# Patient Record
(demographics unavailable — no encounter records)

---

## 2017-04-13 NOTE — OP
DATE OF OPERATION:  04/12/17 [facility]

 

DATE OF BIRTH:  05/24/92

 

SURGEON:  Ron Sher MD

 

ASSISTANT:  MICHELLE Gramajo

 

ANESTHESIOLOGIST:  Dr. De La Paz.

 

ANESTHESIA:  General.

 

PRE-OP DIAGNOSIS:  Chronic cholecystitis.

 

POST-OP DIAGNOSIS:  Chronic cholecystitis.

 

OPERATIVE PROCEDURE:  Laparoscopic cholecystectomy.

 

ESTIMATED BLOOD LOSS:  Minimal blood loss.

 

FLUIDS:  Minimal crystalloid fluid given.

 

SPECIMEN:  Gallbladder.

 

DRAINS:  None.

 

DESCRIPTION OF PROCEDURE:  The patient was identified in the preoperative area, 
brought to the OR, placed on the operating table in the supine position. 
Preoperative antibiotics were given.  Sequential devices were placed on 
bilateral lower extremities.  General anesthesia was induced. The patient's 
abdomen was prepped and draped in standard surgical fashion and time-out was 
performed.

 

Folds of the umbilicus were elevated anteriorly and a Veress needle was 
inserted into the abdominal cavity, which was then allowed to insufflate to a 
pressure of 15 mmHg.  The patient tolerated the insufflation well.  Incision 
was made over the Veress needle, which was removed and a 5-mm trocar inserted 
through the umbilicus. Laparoscope was inserted through this.  There was no 
evidence of injury in the trocar insertion of the Veress needle.

 

Attention was turned towards the right upper quadrant. Additional trocars were 
then placed in the following position: 8 mm in the subxiphoid area, two 5 mm 
along the right costal margin.

 

Fundus of the gallbladder was identified.  There was no significant adhesions. 
This was elevated above the liver and the infundibulum was grasped and 
retracted towards the right lower quadrant.  This gave us the critical view 
seeing common bile duct as well as cystic duct.

 

Peritoneum was taken up the lateral aspect of the gallbladder as well as the 
medial aspect.  Noted below was retracted posteriorly and the cystic duct was 
isolated. Cystic artery was quite posterior and this was isolated as well.  
Both were triply clipped and ligated and the gallbladder was removed from the 
liver bed and placed in 10 mm endoscopic retrieval bag that had been placed 
through the subxiphoid incision after removing the port and brought out through 
the site.  Hemostasis was excellent.  Cystic duct stump and cystic artery stump 
showed no bleeding or bile. The abdomen was allowed to collapse.  Trocars were 
removed under direct vision and all 4 skin incisions were reapproximated with 4-
0 Monocryl subcuticular sutures followed by Steri-Strips and sterile dressing.



CC:  [facility]; Dr. Roy*

 

 [id number]/CPS #: [id number]

ASHLEY

## 2019-02-10 NOTE — XMS REPORT
Continuity of Care Document (CCD)

 Created on:2019



Patient:Meche Puente

Sex:Female

:1992

External Reference #:[id number]





Demographics







 Address  [address] [place] [address]

 

 Mobile Phone  [phone]

 

 Email Address  [email or web address]

 

 Preferred Language  en

 

 Marital Status  Not  or 

 

 Mosque Affiliation  Unknown

 

 Race  White

 

 Ethnic Group  Not  or 









Author







 Name  Meche Hatch NP

 

 Address  [address]



   Unavailable



   [place] [address]









Support







 Name  Relationship  Address  Phone

 

 Gustavo Asher  Domestic/Life Partner  Unavailable  [phone]









Care Team Providers







 Name  Role  Phone

 

 Lexie Low M.D.  Care Team Information   Unavailable

 

 Lexie Low M.D.  Primary Care Physician  Unavailable









Payers







 Type  Date  Identification Numbers  Payment Provider  Subscriber

 

   Effective: 2018  Policy Number: [id number]  [place]  Meche Puente









 PayID: [id number]  [address] [place] [address]









   Effective: 2017  Policy Number: [id number]  BC/BS Of [place]ULA Puente









 Expires: 2018  PayID: [id number]  [address] [place] [address]







Advance Directives







 Description

 

 No Information Available







Problems







 Description

 

 No Information







Family History







 Date  Family Member(s)  Problem(s)  Comments

 

   Father  Factor V Leiden Mutation  

 

 Onset: (2019)  Father  65  

 

   Mother  Breast Cancer  

 

 Onset: (2019)  Mother  63  







Social History







 Type  Date  Description  Comments

 

 Birth Sex    Unknown  

 

 Tobacco Use  Start: Unknown  Denies Tobacco Use  

 

 ETOH Use    Occasional  

 

 Tobacco Use  Start: Unknown  Patient has never smoked  

 

 Recreational Drug Use    Denies Drug Use  

 

 Currently Active    Patient is currently sexually active  







Allergies, Adverse Reactions, Alerts







 Date  Description  Reaction  Status  Severity  Comments

 

 2018  Amoxicillin  CHILDHOOD ALLERGY  Active    

 

 2018  Penicillin  CHILDHOOD ALLERGY  Active    

 

 2018  Azithromycin  STOMACH ISSUES  Active    

 

 2018  Sulfa  HIVES  Active    







Medications







 Medication  Date  Status  Form  Strength  Qnty  SIG  Indications  Ordering



                 Provider

 

 Linzess  00/00/  Active  Capsules  145mcg    take one    Unknown



   0000          capsule by    



             mouth every    



             other morning    



             prior to    



             breakfast for    



             constipation    

 

                 

 

 Cholestyramine  /  Hx  Powder  4GM/Dose    2 grams daily    Unknown



   0000 -          in 1 cup of    



             water daily    



   2019              







Immunizations







 Description

 

 No Information Available







Vital Signs







 Date  Vital  Result  Comment

 

 2019 10:57am  BP Systolic  140 mmHg  









 BP Diastolic  82 mmHg  

 

 Heart Rate  81 /min  

 

 Body Temperature  98.1 F  

 

 Respiratory Rate  16 /min  

 

 Height  68.5 inches  5'8.50"

 

 Weight  220.00 lb  

 

 BMI (Body Mass Index)  33.0 kg/m2  









 2018  9:47am  BP Systolic  122 mmHg  









 BP Diastolic  80 mmHg  

 

 Heart Rate  106 /min  

 

 Body Temperature  98.4 F  

 

 Height  68 inches  5'8"

 

 Weight  229.00 lb  

 

 BMI (Body Mass Index)  34.8 kg/m2  







Results







 Test  Date  Facility  Test  Result  H/L  Range  Note

 

 Laboratory test  2019  [facility]  Cytology  <pending>      



 finding      Thinprep        



       w/rfx([facility])        

 

 Comprehensive  2018  [facility]  Sodium  141 mEq/L    134-149  



 Metabolic Prof              









 Potassium  4.2 mEq/L    3.6-5.5  

 

 Chloride  103 mEq/L      

 

 Carbon Dioxide  27 mEq/L    21-32  

 

 Glucose  100 mg/dL      

 

 BUN  7 mg/dL    6-26  

 

 Creatinine  0.7 mg/dL    0.6-1.4  

 

 BUN/Creat Ratio  10.0 CALC    8.0-36.0  

 

 Calcium  10.0 mg/dL    8.6-10.2  

 

 Total Protein  7.4 g/dL    6.4-8.3  

 

 Albumin  5.0 g/dL    3.8-5.5  

 

 Globulin  2.4 g/dL    2.0-4.8  

 

 A/G Ratio  2.1 CALC    0.6-2.3  

 

 Alk. Phosphatase  81 U/L      

 

 Alt (SGPT)  73 U/L  High  7-35  

 

 Ast (Sgot)  55 U/L  High  5-34  

 

 Total Bilirubin  0.7 mg/dL    0.2-1.3  

 

 GFR Non-African American  >60 ml/min/1.73m^    >=60  

 

 GFR African American  >60 ml/min/1.73m^    >=60  









 Laboratory test  2018  [facility]  Free T4  1.10 ng/dL    0.75-
1.54  



 finding              









 TSH  1.74 mIU/L    0.50-6.00  









 CBC Electronic [facility]  2018  [facility]  WBC  8.8 x10^3/UL    4.0-
10.0  









 RBC  5.30 x10^6/UL    3.93-6.00  

 

 HGB  15.6 g/dL    12.0-17.0  

 

 HCT  46 %    35-50  

 

 MCV  86.6 fL    80.0-95.0  

 

 MCH  29.4 pg    25.6-32.2  

 

 MCHC  34.0 g/dL    32.2-36.0  

 

 RDW-CV  12.0 %    11.6-14.4  

 

 PLT  345 x10^3/UL    163-400  

 

 MPV  9.6 fL    9.4-12.4  

 

 Song#  5.94 x10^3/UL    1.56-6.13  

 

 Lymph#  1.94 x10^3/UL    1.18-3.74  

 

 Mono#  0.74 x10^3/UL    0.24-0.82  

 

 Eos #  0.1 x10^3/UL    0.0-0.5  

 

 Baso #  0.06 x10^3/UL    0.01-0.08  

 

 Song%  67.2 %    34.0-70.0  

 

 Lymph %  21.9 %    20.0-52.0  

 

 Mono%  8.4 %    5.0-12.0  

 

 Eos%  1.5 %    0.7-7.0  

 

 Baso%  0.7 %    0.1-1.2  







Procedures







 Description

 

 No Information Available







Encounters







 Type  Date  Location  Provider  Dx  Diagnosis

 

 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 
irritable



   10:00a    FNP    bowel syndrome









 Z30.09  Encounter for ot general coun and advice on contraception

 

 R53.81  Other malaise







Plan of Treatment

Future Appointment(s):2019  2:45 pm - Meche Hatch NP at [facility] 
[id number] - Meche Hatch NPZ00.00 Encounter for general adult 
medical examination without abnoNew Labs:Comp Metabolic-[facility], Ordered
: 19TSH ([facility]), Ordered: 19CBC Electronic (a New), 
Ordered: 19Comments:Encourage an active and healthy lifestyle with proper 
eating habits including fruits, vegetables, 6-8 glasses of water a day and 
monitoring portion size. Recommend 30 minutes of daily physical 
activityincluding walking, aerobic exercise, sports, yoga or dance. Any 
activity is better than no activity.Recommend routine eye and dental exams. 
Encourage 1200 units of calcium and 1000 units of vitamin D daily. Next 
physical is due in 1-2 years.Z12.4 Encounter for screening for malignant 
neoplasm of vywcxoM11.220 Encounter for screening for lipoid disordersNew Labs:
Lipid Panel-[facility], Ordered: 19R10.2 Pelvic and perineal 
painAllComments:~B_~U_Medication Management~b_~u_ Patient Understands 
medications  he 's taking?     Yes    No  Are there Barriers to Adherence?    
Yes    No  Has the patient been asked about herbal supplements and therapies, 
and OTC meds?      Yes    No      ~B_~U_Care Plan~b_~u_1.  Patient has been 
queried about patient's goals/preferences and functional/lifestyle goals at 
relevant visits.  If relevant, describe: na2.  Treatment goals as explained to 
the patient: above3.  Are there barriers to meeting treatment goals?  Yes    No
      If Yes, please describe:4.  Self-Management goals as described to the 
patient:Yes     NoAs always, we strongly encourage a healthy diet and making 
physical activity a part of your every day life. If you have questions about 
how or where to start, please contact the office.

## 2019-02-10 NOTE — ED
GI/ HPI





- HPI Summary


HPI Summary: 





This patient is a 26 year old F presenting to [facility] accompanied by family with 

a chief complaint of vaginal bleeding with  intermittent , non-radiating, 

stabbing right pelvic pain for the past couple weeks progressively worsening 

from last night to today. Pain is rated 5/10 in severity. Patient states she 

had a pap smear five days ago for regular follow up and for her recent pain. 

Patient reports a family history of ovarian cysts.  Patient does not believe 

bleeding is a menstrual period as he LNMP was 1/20/19.





- History of Current Complaint


Chief Complaint: EDVaginalBleeding


Time Seen by Provider: 02/10/19 13:37


Stated Complaint: VAGINAL BLEEDING


Hx Obtained From: Patient


Hx Last Menstrual Period: Implant - no periods


Onset/Duration: Started Weeks Ago, Worse Since - last night


Timing: Intermittent


Current Severity: Moderate


Pain Intensity: 5


Location of Pain: RLQ


Additional Signs & Symptoms: Positive: Vaginal Bleeding


Alleviating Factor(s): Nothing





- Allergy/Home Medications


Allergies/Adverse Reactions: 


 Allergies











Allergy/AdvReac Type Severity Reaction Status Date / Time


 


amoxicillin Allergy  Rash Verified 08/10/18 15:15


 


erythromycin base Allergy  GI Upset Verified 08/17/18 10:17





[From E-Mycin]     


 


Penicillins Allergy  Rash Verified 08/10/18 15:15


 


Sulfa (Sulfonamide Allergy  Hives Verified 08/17/18 10:16





Antibiotics)     











Home Medications: 


 Home Medications





Linaclotide (NF) [Linzess (NF)] 145 mcg PO DAILY 02/10/19 [History Confirmed 02/

10/19]











PMH/Surg Hx/FS Hx/Imm Hx


Endocrine/Hematology History: 


   Denies: Hx Diabetes, Hx Thyroid Disease


Cardiovascular History: 


   Denies: Hx Hypertension


Respiratory History: Reports: Hx Asthma - prn med


   Denies: Hx Chronic Obstructive Pulmonary Disease (COPD)


GI History: Reports: Other GI Disorders - gallbladder attacks, found stones


   Denies: Hx Ulcer


Musculoskeletal History: Reports: Hx Tendonitis - bilateral knees


Sensory History: Reports: Hx Contacts or Glasses - glasses


Opthamlomology History: Reports: Hx Contacts or Glasses - glasses





- Surgical History


Surgery Procedure, Year, and Place: none reported


Hx Anesthesia Reactions: No


Infectious Disease History: No


Infectious Disease History: 


   Denies: Hx Clostridium Difficile, Hx Hepatitis, Hx Human Immunodeficiency 

Virus (HIV), Hx of Known/Suspected MRSA, Hx Shingles, Hx Tuberculosis, Hx Known/

Suspected VRE, Hx Known/Suspected VRSA, History Other Infectious Disease, 

Traveled Outside the [place] in Last 30 Days





- Family History


Known Family History: Positive: Other - ovarian cysts





- Social History


Alcohol Use: None


Substance Use Type: Reports: None


Smoking Status (MU): Never Smoked Tobacco





Review of Systems


Constitutional: Negative


Positive: pain, other - vaginal bleeding


All Other Systems Reviewed And Are Negative: Yes





Physical Exam





- Summary


Physical Exam Summary: 





Appearance: The patient is well-nourished in no acute distress and in no acute 

pain.


 


Skin: The skin is warm and dry and skin color reflects adequate perfusion.





HEENT: The head is normocephalic and atraumatic. The pupils are equal and 

reactive. The conjunctivae are clear and without drainage. Nares are patent and 

without drainage. Mouth reveals moist mucous membranes and the throat is 

without erythema and exudate. The external ears are intact. The ear canals are 

patent and without drainage. The tympanic membranes are intact.


 


Neck: The neck is supple with full range of motion and non-tender. There are no 

carotid bruits. There is no neck vein distension.


 





Respiratory: Chest is non-tender. Lungs are clear to auscultation and breath 

sounds are symmetrical and equal.


 


Cardiovascular: Heart is regular rate and rhythm. There is no murmur or rub 

auscultated. There is no peripheral edema and pulses are symmetrical and equal.


 


Abdomen: The abdomen is soft with right pelvic tenderness. There are normal 

bowel sounds heard in all four quadrants and there is no organomegaly palpated.


 


Musculoskeletal: There is no back tenderness noted. Extremities are non-tender 

with full range of motion. There is good capillary refill. There is no 

peripheral edema or calf tenderness elicited.


 


Neurological: Patient is alert and oriented to person, place and time. The 

patient has symmetrical motor strength in all four extremities. Cranial nerves 

are grossly intact. Deep tendon reflexes are symmetrical and equal in all four 

extremities.


 


Psychiatric: The patient has an appropriate affect and does not exhibit any 

anxiety or depression.





Triage Information Reviewed: Yes


Vital Signs On Initial Exam: 


 Initial Vitals











Temp Pulse Resp BP Pulse Ox


 


 98.2 F   80   18   148/92   99 


 


 02/10/19 13:29  02/10/19 13:29  02/10/19 13:29  02/10/19 13:29  02/10/19 13:29











Vital Signs Reviewed: Yes





Diagnostics





- Vital Signs


 Vital Signs











  Temp Pulse Resp BP Pulse Ox


 


 02/10/19 13:29  98.2 F  80  18  148/92  99














- Laboratory


Result Diagrams: 


 02/10/19 14:48





 02/10/19 14:48


Lab Statement: Any lab studies that have been ordered have been reviewed, and 

results considered in the medical decision making process.





- Additional Comments


Diagnostic Additional Comments: 





A pelvis ultrasound reveals, as per radiologist: NEGATIVE EXAM.





ED Physician has reviewed this report. 





GIGU Course/Dx





- Course


Course Of Treatment: Ms. Puente presented complaining of right lower abdominal 

pain that is crampy in nature and has been going on for couple of weeks.  In 

the last couple of days she started with some vaginal bleeding.  She had a Pap 

smear about a week ago.  She's not had any nausea vomiting decreased appetite.  

She has an implant and has not had a period in quite a while.  She was nontoxic 

in appearance and her vital signs are stable.  She was tender in her right low 

abdomen more adnexal than McBurney's.  She was afebrile, had no leukocytosis 

and this ultrasound of her pelvis was unremarkable.  I'm not sure the etiology 

of her pain and bleeding but I recommended follow-up with OB/GYN.  I don't 

believe this is appendicitis but recommended she return if she had any 

worsening pain, fever or other GI symptoms such as decreased appetite.





- Diagnoses


Provider Diagnoses: 


 Pelvic pain








Discharge





- Sign-Out/Discharge


Documenting (check all that apply): Patient Departure - discharge


Patient Received Moderate/Deep Sedation with Procedure: No





- Discharge Plan


Condition: Stable


Disposition: HOME


Patient Education Materials:  Pelvic Pain in Women (ED)


Referrals: 


Lexie Low MD [Primary Care Provider] - 1 Week


Additional Instructions: 


RETURN TO THE EMERGENCY DEPARTMENT FOR CHANGING OR WORSENING SYMPTOMS.





- Billing Disposition and Condition


Condition: STABLE


Disposition: Home





- Attestation Statements


Document Initiated by Scribe: Yes


Documenting Scribe: Clara Meyer


Provider For Whom Christian is Documenting (Include Credential): Chris Rojas MD


Scribe Attestation: 


Clara MONZON, scribed for Chris Rojas MD on 02/11/19 at 1132. 


Scribe Documentation Reviewed: Yes


Provider Attestation: 


The documentation as recorded by the Clara peres accurately reflects 

the service I personally performed and the decisions made by me, Chris Rojas MD


Status of Scribe Document: Viewed